# Patient Record
(demographics unavailable — no encounter records)

---

## 2025-02-27 NOTE — ASSESSMENT
[FreeTextEntry1] : 1) Pt to send copy of prior medical records  2) MRI brain  3) r/aEEG 4) Follow up after testing to discuss risks/benefits of coming off of AEDs

## 2025-02-27 NOTE — HISTORY OF PRESENT ILLNESS
[FreeTextEntry1] : Maribell is a 29 year old female with hypothyroid presenting with seizures. Accompanied by her partner, Miguel.   Seizures started in 2014 when she was 18 years old, living in [place]. Presented with feeling of de ja vu and blank staring. Lasted for about 30 seconds. Following events, felt "hungover" for about 20 minutes before returning to normal. Happening daily. Initially, thought to be anxiety attacks. Prescribed Prozac, then Lamotrigine.  First MRI / EEG several months later in 2015. MRI showed R temporal lesion.   Several EMU stays, provoking GTCs. Added Oxcarbazepine, Gabapentin, and Keppra to her Lamotrigine, but continued to have seizures. Started having nocturnal GTCs at home.   Stereo EEG in August, 2019. February 2020 underwent laser ablation and has been seizure free since. Discharged on Keppra and Lamotrigine. Discontinued Lamotrigine last week - was weaning AEDs with prior neurologist. Does not believe she has had an MRI or EEG since her surgery.    Interested in weaning all of her AEDs if she can.   Meds: Keppra 500mg BID  History of likely benign essential tremor. Started around the time she started AEDs, has not improved since stopping Lamotrigine. Notes both of her parents have similar tremors. Tremor is worse when she is stresed, and improves with alcohol. Takes Propranolol 40mg prn which helps.

## 2025-02-27 NOTE — PHYSICAL EXAM
[FreeTextEntry1] : General: Constitutional: Sitting comfortably in NAD Psychiatric: well-groomed, appropriate affect, insight/judgement intact Ears, Nose, Throat: no abnormalities, mucus membranes moist Extremities: no edema, clubbing, or cyanosis Skin: no rash or neurocutaneous signs   Cognitive: Orientation, language, memory and knowledge screens intact Cranial Nerves: II: Full to confrontation; III/IV/VI: PERRL EOMI, no nystagmus V1V2V3: Symmetric. VII: Face appears symmetric. VIII: Normal to screening, IX/X: Palate elevates symmetrical. XI: Trapezius symmetric. XII: Tongue midline   Motor: Power: 5/5 throughout Tone: Normal x4 limbs Tremor: upper extremity tremor    Sensation: intact to light touch   Coordination/Gait: Finger-nose-finger intact, normal rapid-alternating movements Fine motor normal with normal rapid finger taps and heel tapping Romberg negative

## 2025-02-27 NOTE — DISCUSSION/SUMMARY
[FreeTextEntry1] : 29 year old female with history of epilepsy due to R temporal lesion. Now s/p laser ablation in 2020 and seizure free since. Continues Keppra 500mg BID

## 2025-02-27 NOTE — END OF VISIT
[FreeTextEntry3] : I, Lalitha Morales, attest that this documentation has been prepared under the direction in and the presence of provider Gilberto Smith MD.

## 2025-03-28 NOTE — PLAN
[FreeTextEntry1] : Pt is a 28 yo G0 with h/o abnormal pap presenting for annual GYN visit.    #H/o abnormal pap - S/p HPV vaccine - Some records available on pts phone - HPV pos (16/18 neg) ASCUS in 2024 - Pap and HPV testing today - Discussed should send full records to office   #HCM -Pt happy with paragard -Discussed exercise/diet -Breast self awareness reviewed   Follow up PRN or 1 year for next annual

## 2025-03-28 NOTE — HISTORY OF PRESENT ILLNESS
[FreeTextEntry1] : GYN Annual Visit   Pt is a 30 yo G0 presenting for GYN annual visit. Denies specific complaints today but reports ho abnormal paps. Pt recently moved to [facility] from [place]. Has some records on her phone.   GYNHx: Menstrual cycle: LMP 3/13/2025 regular cycles lasting 8 days Denies STIs/fibroids/cysts/polyps S/p HPV vaccine series Sexually active with male partner Contraception - copper IUD placed 10/2018 H/o abnormal pap since 2018 - has been HPV pos since that time. Has had colposcopies per pt.   Records reviewed on pts phone:  3/2024 ASCUS HPV+ (16/18 neg) 3/2023 CIN1   OBHx: G0 PMH: h/o epileptic seizures, now controlled with medication and sp surgery (follows with neurosurgery), hypothyroidism PSH: brain surgeryx2, wisdom tooth removal SocHx: Social alcohol use, Denies drug and tobacco use. FamHx: Maternal grandmother with breast cancer in her 80s, Denies FHx colon, GYN cancer Meds: Synthroid, Keppra, propranolol  Allergies: NKDA

## 2025-03-28 NOTE — PHYSICAL EXAM
[Chaperone Present] : A chaperone was present in the examining room during all aspects of the physical examination [FreeTextEntry2] : Valeri Steinberg [Appropriately responsive] : appropriately responsive [Alert] : alert [No Acute Distress] : no acute distress [Soft] : soft [Non-tender] : non-tender [Non-distended] : non-distended [Oriented x3] : oriented x3 [Examination Of The Breasts] : a normal appearance [No Masses] : no breast masses were palpable [Labia Majora] : normal [Labia Minora] : normal [IUD String] : an IUD string was noted [Normal] : normal [Uterine Adnexae] : normal

## 2025-04-23 NOTE — HISTORY OF PRESENT ILLNESS
[FreeTextEntry1] : Follow up Maribell is a 29 year old female with hypothyroid presenting with seizures. Accompanied by her partner, Miguel.  Seizures started in 2014 when she was 18 years old, living in [place]. Presented with feeling of de ja vu and blank staring. Lasted for about 30 seconds. Following events, felt "hungover" for about 20 minutes before returning to normal. Happening daily. Initially, thought to be anxiety attacks. Prescribed Prozac, then Lamotrigine.  First MRI / EEG several months later in 2015. MRI showed R temporal lesion.  Several EMU stays, provoking GTCs. Added Oxcarbazepine, Gabapentin, and Keppra to her Lamotrigine, but continued to have seizures. Started having nocturnal GTCs at home.  Stereo EEG in August, 2019. February 2020 underwent laser ablation and has been seizure free since. Discharged on Keppra and Lamotrigine. Discontinued Lamotrigine last week - was weaning AEDs with prior neurologist. Does not believe she has had an MRI or EEG since her surgery.  Interested in weaning all of her AEDs if she can.  Meds: Keppra 500mg BID  History of likely benign essential tremor. Started around the time she started AEDs, has not improved since stopping Lamotrigine. Notes both of her parents have similar tremors. Tremor is worse when she is stresed, and improves with alcohol. Takes Propranolol 40mg prn which helps.  Surgical History See above   HPI    4/23/2025 Stable No seizures No auras  MRI" minor surgical changes REEG: no abnormalities  Wishes to come off Keppra No other medical issues

## 2025-04-23 NOTE — REASON FOR VISIT
[Home] : at home, [unfilled] , at the time of the visit. [Medical Office: ([facility])___] : at the medical office located in  [Telehealth (audio & video)] : This visit was provided via telehealth using real-time 2-way audio visual technology. [Verbal consent obtained from patient] : the patient, [unfilled]

## 2025-04-23 NOTE — ASSESSMENT
[FreeTextEntry1] : Plan Reduce Keppra by 250 mg qmonth REEG when she is on 250 mg/day If normal will DC   Risk discussed

## 2025-04-23 NOTE — DISCUSSION/SUMMARY
[FreeTextEntry1] : 28 y/o woman s/p laser ablation Sz free x many years Normal EEG  Wishes to come off Keppra